# Patient Record
Sex: FEMALE | Race: AMERICAN INDIAN OR ALASKA NATIVE | NOT HISPANIC OR LATINO | URBAN - METROPOLITAN AREA
[De-identification: names, ages, dates, MRNs, and addresses within clinical notes are randomized per-mention and may not be internally consistent; named-entity substitution may affect disease eponyms.]

---

## 2018-08-28 ENCOUNTER — OUTPATIENT (OUTPATIENT)
Dept: OUTPATIENT SERVICES | Facility: HOSPITAL | Age: 23
LOS: 1 days | Discharge: HOME | End: 2018-08-28

## 2018-08-28 DIAGNOSIS — E11.9 TYPE 2 DIABETES MELLITUS WITHOUT COMPLICATIONS: ICD-10-CM

## 2018-08-28 DIAGNOSIS — D64.9 ANEMIA, UNSPECIFIED: ICD-10-CM

## 2018-08-28 DIAGNOSIS — E55.9 VITAMIN D DEFICIENCY, UNSPECIFIED: ICD-10-CM

## 2018-08-28 DIAGNOSIS — E78.5 HYPERLIPIDEMIA, UNSPECIFIED: ICD-10-CM

## 2018-08-28 DIAGNOSIS — N39.0 URINARY TRACT INFECTION, SITE NOT SPECIFIED: ICD-10-CM

## 2019-05-16 ENCOUNTER — RESULT REVIEW (OUTPATIENT)
Age: 24
End: 2019-05-16

## 2019-05-16 ENCOUNTER — OUTPATIENT (OUTPATIENT)
Dept: OUTPATIENT SERVICES | Facility: HOSPITAL | Age: 24
LOS: 1 days | Discharge: HOME | End: 2019-05-16

## 2019-05-17 DIAGNOSIS — N76.0 ACUTE VAGINITIS: ICD-10-CM

## 2019-05-17 DIAGNOSIS — Z01.411 ENCOUNTER FOR GYNECOLOGICAL EXAMINATION (GENERAL) (ROUTINE) WITH ABNORMAL FINDINGS: ICD-10-CM

## 2019-06-04 PROBLEM — Z00.00 ENCOUNTER FOR PREVENTIVE HEALTH EXAMINATION: Status: ACTIVE | Noted: 2019-06-04

## 2019-06-12 ENCOUNTER — APPOINTMENT (OUTPATIENT)
Dept: PEDIATRIC ORTHOPEDIC SURGERY | Facility: CLINIC | Age: 24
End: 2019-06-12

## 2019-08-01 ENCOUNTER — FORM ENCOUNTER (OUTPATIENT)
Age: 24
End: 2019-08-01

## 2019-08-02 ENCOUNTER — APPOINTMENT (OUTPATIENT)
Dept: ORTHOPEDIC SURGERY | Facility: CLINIC | Age: 24
End: 2019-08-02
Payer: COMMERCIAL

## 2019-08-02 ENCOUNTER — OUTPATIENT (OUTPATIENT)
Dept: OUTPATIENT SERVICES | Facility: HOSPITAL | Age: 24
LOS: 1 days | End: 2019-08-02
Payer: COMMERCIAL

## 2019-08-02 VITALS
HEIGHT: 61 IN | BODY MASS INDEX: 21.14 KG/M2 | WEIGHT: 112 LBS | DIASTOLIC BLOOD PRESSURE: 70 MMHG | SYSTOLIC BLOOD PRESSURE: 115 MMHG

## 2019-08-02 PROCEDURE — 72082 X-RAY EXAM ENTIRE SPI 2/3 VW: CPT

## 2019-08-02 PROCEDURE — 99204 OFFICE O/P NEW MOD 45 MIN: CPT

## 2019-08-02 PROCEDURE — 72100 X-RAY EXAM L-S SPINE 2/3 VWS: CPT

## 2019-08-02 PROCEDURE — 72084 X-RAY EXAM ENTIRE SPI 6/> VW: CPT | Mod: 26

## 2019-08-05 NOTE — PHYSICAL EXAM
[de-identified] : General: patient is well developed, well nourished, in no acute \par distress, alert and oriented x 3. \par \par Mood and affect: normal\par \par Respiratory: no respiratory distress noted\par \par Skin: no scars over spine, skin intact, no erythema, increased warmth\par \par Alignment:The spine is well compensated in the coronal and sagittal plane.  There is no asymmetry on the bradley forward bend test\par \par Gait: The patient is able to toe walk and heel walk without difficulty. The patient is able to tandem gait without difficulty.\par \par Palpation: no tenderness to palpation spine or paraspinal region\par \par Range of motion: Lumbar spine ROM is full\par \par Neurologic Exam:\par Motor: Manual Muscle testing in the lower extremities is 5 out of 5 in all muscle groups. There is no evidence of muscular atrophy in the lower extremities. Sensory: Sensation to light touch is grossly intact in the lower extremities\par \par Reflexes: DTR are present and symmetric throughout, negative anne bilaterally, negative inverted radial reflex bilaterally, no clonus, plantar responses are flexor\par \par Hip Exam: No pain with internal or external rotation of hips bilaterally\par \par Special tests: Straight leg raise test negative.  Cross straight leg test negative.  GREG test negative\par \par Vascular: Examination of the peripheral vascular system demonstrates no evidence of congestion or edema. 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

## 2019-08-05 NOTE — DISCUSSION/SUMMARY
[de-identified] : Discussed the results of the patient's history, physical exam, and imaging. Ms. Cho has been suffering from nonradiating low back pain for years, worse over the past 1-2 years. She was diagnosed with bilateral L5 pars defects without spondylolisthesis years ago. Wore TLSO brace for 1 year and completed physical therapy with significant relief of back pain at that time. Neurologically intact. I am recommending an updated MRI lumbar without contrast for further evaluation, order given. The patient will follow up with me after imaging is completed, sooner if there is an issue.  All questions were answered.\par \par

## 2019-08-05 NOTE — HISTORY OF PRESENT ILLNESS
[de-identified] : Ms. HAYNES is a very pleasant 23 year old female who complains of low back pain for years, worse over the past 1-2 years. On initial presentation symptoms were as follows: Patient described the pain as intermittent.  Patient rated the pain as moderate 5-6/10 severity.  The pain localized to lower back.  There is no radiation of pain.  Patient  denies extremity numbness and paresthesias. The pain is relieved by stretching.  The pain is worsened by prolonged standing and ambulation. Patient was diagnosed with L5 bilateral spondylolysis approximately 10 years ago, was braced for 1 year with significant improvement of low back pain. Patient also had physical therapy years ago with significant relief of low back pain. Pain does not currently limit activities.\par \par The patient reports no loss of hand dexterity.\par The patient states there no loss of balance when walking.\par There no sensory loss in the arms or legs\par The patent no difficulty with urination.\par \par The patient no history of previous spine surgery.\par \par Pain:\par Back 100 Right Leg 0 Left Leg 0\par \par The patient has no history of unexpected weight loss, no history of active cancer, no history bladder or bowel dysfunction, no night pain, no fevers or chills.\par \par The past medical history, surgical history, family history, allergies, medications, 10+ point review of systems, family history and social history were reviewed and non contributory.\par \par

## 2019-08-09 ENCOUNTER — FORM ENCOUNTER (OUTPATIENT)
Age: 24
End: 2019-08-09

## 2019-08-10 ENCOUNTER — OUTPATIENT (OUTPATIENT)
Dept: OUTPATIENT SERVICES | Facility: HOSPITAL | Age: 24
LOS: 1 days | End: 2019-08-10
Payer: COMMERCIAL

## 2019-08-10 ENCOUNTER — APPOINTMENT (OUTPATIENT)
Dept: MRI IMAGING | Facility: HOSPITAL | Age: 24
End: 2019-08-10
Payer: COMMERCIAL

## 2019-08-10 PROCEDURE — 72148 MRI LUMBAR SPINE W/O DYE: CPT

## 2019-08-10 PROCEDURE — 72148 MRI LUMBAR SPINE W/O DYE: CPT | Mod: 26

## 2019-08-14 ENCOUNTER — APPOINTMENT (OUTPATIENT)
Dept: ORTHOPEDIC SURGERY | Facility: CLINIC | Age: 24
End: 2019-08-14
Payer: COMMERCIAL

## 2019-08-14 PROCEDURE — 99213 OFFICE O/P EST LOW 20 MIN: CPT

## 2019-10-18 NOTE — DISCUSSION/SUMMARY
[de-identified] : Discussed the results of the patient's history, physical exam, and imaging. Ms. Cho has been suffering from moderate non radiating low back pain for years, worse over the past 1-2 years. Neurologically intact. Imaging shows bilateral L5 pars defects, no disc herniation or significant stenosis. There is no indication for surgical intervention at this time, but explained this may require surgical intervention in the future. I am recommending a course of physical therapy, referral given. The patient will follow up with me at the end of the physical therapy course, in approximately 2-3 months, sooner if there is an issue. XR lumbar 4 view at that time. All questions were answered.\par \par

## 2019-10-18 NOTE — PHYSICAL EXAM
[de-identified] : General: patient is well developed, well nourished, in no acute \par distress, alert and oriented x 3. \par \par Mood and affect: normal\par \par Respiratory: no respiratory distress noted\par \par Skin: no scars over spine, skin intact, no erythema, increased warmth\par \par Alignment:The spine is well compensated in the coronal and sagittal plane. There is no asymmetry on the bradley forward bend test\par \par Gait: The patient is able to toe walk and heel walk without difficulty. The patient is able to tandem gait without difficulty.\par \par Palpation: no tenderness to palpation spine or paraspinal region\par \par Range of motion: Lumbar spine ROM is full\par \par Neurologic Exam:\par Motor: Manual Muscle testing in the lower extremities is 5 out of 5 in all muscle groups. There is no evidence of muscular atrophy in the lower extremities. Sensory: Sensation to light touch is grossly intact in the lower extremities\par \par Reflexes: DTR are present and symmetric throughout, negative anne bilaterally, negative inverted radial reflex bilaterally, no clonus, plantar responses are flexor\par \par Hip Exam: No pain with internal or external rotation of hips bilaterally\par \par Special tests: Straight leg raise test negative. Cross straight leg test negative. GREG test negative\par \par Vascular: Examination of the peripheral vascular system demonstrates no evidence of congestion or edema. no evidence of lymphedema bilateral lower extremities, pulses are present and symmetric in both lower extremities. [de-identified] : MRI lumbar 8/2019: bilateral L5 pars defects, no disc herniation or significant stenosis, no spondylolisthesis\par \par XR thoracolumbar 8/2/19: bilateral L5 pars defects without evidence of dynamic instability, spina bifida occulta at L5, unchanged from 2009 radiographs, no significant scoliosis, no acute fractures. \par \par  \par

## 2019-10-18 NOTE — HISTORY OF PRESENT ILLNESS
[de-identified] : Follow Up 8/14/19: Patient reports nonradiating low back pain, unchanged from last visit. Denies new neurologic symptoms. Here to review MRI lumbar. \par \par Initial 8/2/19: Ms. HAYNES is a very pleasant 23 year old female who complains of low back pain for years, worse over the past 1-2 years. On initial presentation symptoms were as follows: Patient described the pain as intermittent. Patient rated the pain as moderate 5-6/10 severity. The pain localized to lower back. There is no radiation of pain. Patient denies extremity numbness and paresthesias. The pain is relieved by stretching. The pain is worsened by prolonged standing and ambulation. Patient was diagnosed with L5 bilateral spondylolysis approximately 10 years ago, was braced for 1 year with significant improvement of low back pain. Patient also had physical therapy years ago with significant relief of low back pain. Pain does not currently limit activities.\par \par The patient reports no loss of hand dexterity.\par The patient states there no loss of balance when walking.\par There no sensory loss in the arms or legs\par The patent no difficulty with urination.\par \par The patient no history of previous spine surgery.\par \par Pain:\par Back 100 Right Leg 0 Left Leg 0\par \par The patient has no history of unexpected weight loss, no history of active cancer, no history bladder or bowel dysfunction, no night pain, no fevers or chills.\par \par The past medical history, surgical history, family history, allergies, medications, 10+ point review of systems, family history and social history were reviewed and non contributory.\par

## 2019-10-18 NOTE — PHYSICAL EXAM
[de-identified] : General: patient is well developed, well nourished, in no acute \par distress, alert and oriented x 3. \par \par Mood and affect: normal\par \par Respiratory: no respiratory distress noted\par \par Skin: no scars over spine, skin intact, no erythema, increased warmth\par \par Alignment:The spine is well compensated in the coronal and sagittal plane. There is no asymmetry on the bradley forward bend test\par \par Gait: The patient is able to toe walk and heel walk without difficulty. The patient is able to tandem gait without difficulty.\par \par Palpation: no tenderness to palpation spine or paraspinal region\par \par Range of motion: Lumbar spine ROM is full\par \par Neurologic Exam:\par Motor: Manual Muscle testing in the lower extremities is 5 out of 5 in all muscle groups. There is no evidence of muscular atrophy in the lower extremities. Sensory: Sensation to light touch is grossly intact in the lower extremities\par \par Reflexes: DTR are present and symmetric throughout, negative anne bilaterally, negative inverted radial reflex bilaterally, no clonus, plantar responses are flexor\par \par Hip Exam: No pain with internal or external rotation of hips bilaterally\par \par Special tests: Straight leg raise test negative. Cross straight leg test negative. GREG test negative\par \par Vascular: Examination of the peripheral vascular system demonstrates no evidence of congestion or edema. no evidence of lymphedema bilateral lower extremities, pulses are present and symmetric in both lower extremities. [de-identified] : MRI lumbar 8/2019: bilateral L5 pars defects, no disc herniation or significant stenosis, no spondylolisthesis\par \par XR thoracolumbar 8/2/19: bilateral L5 pars defects without evidence of dynamic instability, spina bifida occulta at L5, unchanged from 2009 radiographs, no significant scoliosis, no acute fractures. \par \par  \par

## 2019-10-18 NOTE — DISCUSSION/SUMMARY
[de-identified] : Discussed the results of the patient's history, physical exam, and imaging. Ms. Cho has been suffering from moderate non radiating low back pain for years, worse over the past 1-2 years. Neurologically intact. Imaging shows bilateral L5 pars defects, no disc herniation or significant stenosis. There is no indication for surgical intervention at this time, but explained this may require surgical intervention in the future. I am recommending a course of physical therapy, referral given. The patient will follow up with me at the end of the physical therapy course, in approximately 2-3 months, sooner if there is an issue. XR lumbar 4 view at that time. All questions were answered.\par \par

## 2019-10-18 NOTE — HISTORY OF PRESENT ILLNESS
[de-identified] : Follow Up 8/14/19: Patient reports nonradiating low back pain, unchanged from last visit. Denies new neurologic symptoms. Here to review MRI lumbar. \par \par Initial 8/2/19: Ms. HAYNES is a very pleasant 23 year old female who complains of low back pain for years, worse over the past 1-2 years. On initial presentation symptoms were as follows: Patient described the pain as intermittent. Patient rated the pain as moderate 5-6/10 severity. The pain localized to lower back. There is no radiation of pain. Patient denies extremity numbness and paresthesias. The pain is relieved by stretching. The pain is worsened by prolonged standing and ambulation. Patient was diagnosed with L5 bilateral spondylolysis approximately 10 years ago, was braced for 1 year with significant improvement of low back pain. Patient also had physical therapy years ago with significant relief of low back pain. Pain does not currently limit activities.\par \par The patient reports no loss of hand dexterity.\par The patient states there no loss of balance when walking.\par There no sensory loss in the arms or legs\par The patent no difficulty with urination.\par \par The patient no history of previous spine surgery.\par \par Pain:\par Back 100 Right Leg 0 Left Leg 0\par \par The patient has no history of unexpected weight loss, no history of active cancer, no history bladder or bowel dysfunction, no night pain, no fevers or chills.\par \par The past medical history, surgical history, family history, allergies, medications, 10+ point review of systems, family history and social history were reviewed and non contributory.\par

## 2020-01-10 ENCOUNTER — RX RENEWAL (OUTPATIENT)
Age: 25
End: 2020-01-10

## 2020-10-26 ENCOUNTER — OUTPATIENT (OUTPATIENT)
Dept: OUTPATIENT SERVICES | Facility: HOSPITAL | Age: 25
LOS: 1 days | Discharge: HOME | End: 2020-10-26
Payer: COMMERCIAL

## 2020-10-26 ENCOUNTER — RESULT REVIEW (OUTPATIENT)
Age: 25
End: 2020-10-26

## 2020-10-26 DIAGNOSIS — M54.9 DORSALGIA, UNSPECIFIED: ICD-10-CM

## 2020-10-26 PROCEDURE — 72110 X-RAY EXAM L-2 SPINE 4/>VWS: CPT | Mod: 26

## 2020-10-27 ENCOUNTER — APPOINTMENT (OUTPATIENT)
Dept: ORTHOPEDIC SURGERY | Facility: CLINIC | Age: 25
End: 2020-10-27
Payer: COMMERCIAL

## 2020-10-27 DIAGNOSIS — G89.29 LOW BACK PAIN: ICD-10-CM

## 2020-10-27 DIAGNOSIS — M43.06 SPONDYLOLYSIS, LUMBAR REGION: ICD-10-CM

## 2020-10-27 DIAGNOSIS — M54.5 LOW BACK PAIN: ICD-10-CM

## 2020-10-27 PROCEDURE — 99213 OFFICE O/P EST LOW 20 MIN: CPT

## 2020-10-27 PROCEDURE — 99072 ADDL SUPL MATRL&STAF TM PHE: CPT

## 2020-11-05 ENCOUNTER — NON-APPOINTMENT (OUTPATIENT)
Age: 25
End: 2020-11-05

## 2020-11-05 ENCOUNTER — APPOINTMENT (OUTPATIENT)
Dept: HEART AND VASCULAR | Facility: CLINIC | Age: 25
End: 2020-11-05
Payer: COMMERCIAL

## 2020-11-05 VITALS
SYSTOLIC BLOOD PRESSURE: 116 MMHG | WEIGHT: 112 LBS | HEIGHT: 61 IN | HEART RATE: 59 BPM | BODY MASS INDEX: 21.14 KG/M2 | DIASTOLIC BLOOD PRESSURE: 67 MMHG

## 2020-11-05 DIAGNOSIS — Z78.9 OTHER SPECIFIED HEALTH STATUS: ICD-10-CM

## 2020-11-05 DIAGNOSIS — R00.2 PALPITATIONS: ICD-10-CM

## 2020-11-05 PROCEDURE — 99215 OFFICE O/P EST HI 40 MIN: CPT | Mod: 25

## 2020-11-05 PROCEDURE — 99072 ADDL SUPL MATRL&STAF TM PHE: CPT

## 2020-11-05 PROCEDURE — 93000 ELECTROCARDIOGRAM COMPLETE: CPT

## 2020-11-05 RX ORDER — NORGESTIMATE AND ETHINYL ESTRADIOL 7DAYSX3 28
KIT ORAL
Refills: 0 | Status: DISCONTINUED | COMMUNITY
End: 2020-11-05

## 2020-11-05 RX ORDER — MULTIVITAMIN
TABLET ORAL
Refills: 0 | Status: DISCONTINUED | COMMUNITY
End: 2020-11-05

## 2020-11-10 NOTE — PHYSICAL EXAM
[General Appearance - Well Developed] : well developed [Normal Appearance] : normal appearance [Well Groomed] : well groomed [General Appearance - Well Nourished] : well nourished [No Deformities] : no deformities [General Appearance - In No Acute Distress] : no acute distress [Normal Conjunctiva] : the conjunctiva exhibited no abnormalities [Normal Oral Mucosa] : normal oral mucosa [Normal Jugular Venous V Waves Present] : normal jugular venous V waves present [] : no respiratory distress [Respiration, Rhythm And Depth] : normal respiratory rhythm and effort [Exaggerated Use Of Accessory Muscles For Inspiration] : no accessory muscle use [Auscultation Breath Sounds / Voice Sounds] : lungs were clear to auscultation bilaterally [5th Left ICS - MCL] : palpated at the 5th LICS in the midclavicular line [Normal] : normal [No Precordial Heave] : no precordial heave was noted [Normal Rate] : normal [Rhythm Regular] : regular [Normal S1] : normal S1 [Normal S2] : normal S2 [No Murmur] : no murmurs heard [No Pitting Edema] : no pitting edema present [Abdomen Soft] : soft [Abnormal Walk] : normal gait [Gait - Sufficient For Exercise Testing] : the gait was sufficient for exercise testing [Cyanosis, Localized] : no localized cyanosis [Skin Color & Pigmentation] : normal skin color and pigmentation [Oriented To Time, Place, And Person] : oriented to person, place, and time [Impaired Insight] : insight and judgment were intact [Affect] : the affect was normal [Mood] : the mood was normal [No Anxiety] : not feeling anxious [Apical Thrill] : no thrill palpable at the apex [Click] : no click [Distant] : the heart sounds were ~L not distant [Pericardial Rub] : no pericardial rub

## 2020-11-10 NOTE — HISTORY OF PRESENT ILLNESS
[FreeTextEntry1] : 25 year old female with history of palpitations, who presents for follow up.\par \par She has a long history of palpitations.  She states this occurs almost daily and comes on rapidly and goes away rapidly.  Usually occurs at rest and correlates with a heart rate of 120bpm on her apple watch.  No precipitating or alleviating factors.  She denies any chest pain, SOB, syncope, near syncope, orthopnea or edema. No significant change from what she has experienced in the past.  She has tried metoprolol in the past with depression and hair loss and stopped it. \par

## 2020-11-10 NOTE — DISCUSSION/SUMMARY
[FreeTextEntry1] : 25 year old female with history of palpitations, who presents for follow up.  Overall, she is doing well.  She continues to have short bursts of palpitations that come on rapidly and go away rapidly, lasting minutes.  This does not interfere with her daily life.  We discussed that she has some sinus arrhythmia on exam, which is benign.  This could be inappropriate sinus tachycardia or a re-entrant tachycardia.  I have ordered her an echocardiogram to assure her heart is still structurally normal.  We will send her an event monitor to see if we can diagnose what she is feeling - this occurs almost daily so a week monitor should be sufficient.  If she has an SVT we discussed options of observation, medications or an EP study.  We will discuss this in more detail after she has these tests.  She knows to call with any questions or concerns.

## 2020-11-10 NOTE — ADDENDUM
[FreeTextEntry1] : I Pramod Kendrick, hereby attest that the medical record entry for today accurately signatures/notates that I made in my capacity as Attending Physician when I treated/diagnosed the above patient. I do hereby attest that this information is true, accurate, and complete to the best of my knowledge and I understand that any falsification, omission, or concealment of material fact may subject me to administrative, civil, or criminal liability. I was present for the entire visit with the patient and I was involved in all the critical and key components of the visit. I was present for the entire visit and oversaw the interaction with the PA.

## 2020-11-10 NOTE — REVIEW OF SYSTEMS
[see HPI] : see HPI [Negative] : Psychiatric [Fever] : no fever [Chills] : no chills [Feeling Fatigued] : not feeling fatigued

## 2021-02-16 NOTE — PHYSICAL EXAM
[de-identified] : General: patient is well developed, well nourished, in no acute \par distress, alert and oriented x 3. \par \par Mood and affect: normal\par \par Respiratory: no respiratory distress noted\par \par Skin: no scars over spine, skin intact, no erythema, increased warmth\par \par Alignment:The spine is well compensated in the coronal and sagittal plane. There is no asymmetry on the bradley forward bend test\par \par Gait: The patient is able to toe walk and heel walk without difficulty. The patient is able to tandem gait without difficulty.\par \par Palpation: no tenderness to palpation spine or paraspinal region\par \par Range of motion: Lumbar spine ROM is full\par \par Neurologic Exam:\par Motor: Manual Muscle testing in the lower extremities is 5 out of 5 in all muscle groups. There is no evidence of muscular atrophy in the lower extremities. Sensory: Sensation to light touch is grossly intact in the lower extremities\par \par Reflexes: DTR are present and symmetric throughout, negative anne bilaterally, negative inverted radial reflex bilaterally, no clonus, plantar responses are flexor\par \par Hip Exam: No pain with internal or external rotation of hips bilaterally\par \par Special tests: Straight leg raise test negative. Cross straight leg test negative. GREG test negative\par \par Vascular: Examination of the peripheral vascular system demonstrates no evidence of congestion or edema. no evidence of lymphedema bilateral lower extremities, pulses are present and symmetric in both lower extremities. \par  [de-identified] : XR: 10/26/2020 pars defect L5, minimal anterolisthesis of L5/S1.\par \par MRI lumbar 8/2019: bilateral L5 pars defects, no disc herniation or significant stenosis, no spondylolisthesis\par \par XR thoracolumbar 8/2/19: bilateral L5 pars defects without evidence of dynamic instability, spina bifida occulta at L5, unchanged from 2009 radiographs, no significant scoliosis, no acute fractures.

## 2021-12-08 ENCOUNTER — TRANSCRIPTION ENCOUNTER (OUTPATIENT)
Age: 26
End: 2021-12-08

## 2022-02-20 ENCOUNTER — TRANSCRIPTION ENCOUNTER (OUTPATIENT)
Age: 27
End: 2022-02-20